# Patient Record
Sex: MALE | Race: WHITE | NOT HISPANIC OR LATINO | URBAN - METROPOLITAN AREA
[De-identification: names, ages, dates, MRNs, and addresses within clinical notes are randomized per-mention and may not be internally consistent; named-entity substitution may affect disease eponyms.]

---

## 2024-03-21 ENCOUNTER — HOSPITAL ENCOUNTER (OUTPATIENT)
Dept: RADIOLOGY | Facility: CLINIC | Age: 29
Discharge: HOME | End: 2024-03-21
Payer: COMMERCIAL

## 2024-03-21 ENCOUNTER — OFFICE VISIT (OUTPATIENT)
Dept: ORTHOPEDIC SURGERY | Facility: CLINIC | Age: 29
End: 2024-03-21
Payer: COMMERCIAL

## 2024-03-21 VITALS — BODY MASS INDEX: 24.74 KG/M2 | HEIGHT: 75 IN | WEIGHT: 199 LBS

## 2024-03-21 DIAGNOSIS — M54.16 LUMBAR RADICULOPATHY: ICD-10-CM

## 2024-03-21 PROCEDURE — 72110 X-RAY EXAM L-2 SPINE 4/>VWS: CPT | Performed by: RADIOLOGY

## 2024-03-21 PROCEDURE — 72110 X-RAY EXAM L-2 SPINE 4/>VWS: CPT

## 2024-03-21 PROCEDURE — 99213 OFFICE O/P EST LOW 20 MIN: CPT | Mod: ZK | Performed by: PHYSICIAN ASSISTANT

## 2024-03-21 PROCEDURE — 99203 OFFICE O/P NEW LOW 30 MIN: CPT | Performed by: PHYSICIAN ASSISTANT

## 2024-03-21 RX ORDER — PREDNISONE 20 MG/1
TABLET ORAL
Qty: 9 TABLET | Refills: 0 | Status: SHIPPED | OUTPATIENT
Start: 2024-03-21

## 2024-03-21 ASSESSMENT — PAIN - FUNCTIONAL ASSESSMENT: PAIN_FUNCTIONAL_ASSESSMENT: 0-10

## 2024-03-21 NOTE — PROGRESS NOTES
Juan Luis is a 28-year-old male who presents today with his mom and his dad.  He is a .  This is not a work-related injury but does believe that it happened while he was working out.    He is a very active individual, he works out on a regular basis.  He states that Thursday he was doing a full body workout and developed pain but it was not until Sunday when the severe sciatica down the left lower extremity started.  The patient reports that it has progressively gotten worse and he only gets relief when he bends forward or he sits in a bent over position.  Anytime he is standing or in an extension position of his torso the symptoms will shoot into his left SI and continue in a posterior lateral distribution down to his calf.    Thankfully his right lower extremity is asymptomatic.  No hip or groin pain and he has full control of his bowel and bladder.  He does ambulate without assistance.    From a treatment standpoint he has not done any formal physical therapy.  He takes ibuprofen and Aleve as needed.      Family, social, and medical histories are obtained and reviewed.  -He Does smoke marijuana    I reviewed the complete 30-point review of systems that was documented on the scanned patient intake form.  All other systems are non-contributory except as defined in history of present illness.    Const: Well-appearing, well-nourished [male] in no distress.  Eyes: Normal appearing sclera and conjunctiva, no jaundice, pupils normal in appearance.  Resp: breathing comfortably, normal respiratory rate.  CV: No upper or lower extremity edema.  Musculoskeletal: [Normal gait].   Lumbar ROM is extremely limited due to muscle tightness and reciprocated pain.  Strength exam of the lower extremities reveals 5/5 strength in all major muscle groups she has extreme a hamstring tightness on the left more than the right..  Positive straight leg raise bilaterally, the patient is unable to fully extend either of his lower  extremities without reciprocated pain.  His left was significantly worse and he has extreme tightness throughout his L4-V nerve.  Neuro: Sensation is intact and equal bilaterally. Deep tendon reflexes are [normal and symmetric].  [No clonus].  Skin: Intact without any lesions, normal turgor.  Psych: Alert and oriented x3, normal mood and affect.    We are going to obtain x-rays of his lumbar spine.  He will get this after the visit    We discussed a couple options.  Based off of his clinical findings and the significance of his pain with an ipsilateral straight leg test along with the left side straight leg test I have a high suspicion of a herniated disc.  I did order the patient an MRI of his lumbar spine and informed him that he should probably get those done at an outside facility, he was given a list of places to call.  Along with that he was also given an order for physical therapy to continue to build his strength and core and back muscles.  I am going to place him on a steroid as well for him to take over the next 7 days and he was given my direct office number to call if he has any questions or concerns.    This note was dictated using speech recognition software and was not corrected for spelling or grammatical errors

## 2024-03-26 ENCOUNTER — OFFICE VISIT (OUTPATIENT)
Dept: ORTHOPEDIC SURGERY | Facility: CLINIC | Age: 29
End: 2024-03-26
Payer: COMMERCIAL

## 2024-03-26 VITALS — WEIGHT: 199 LBS | BODY MASS INDEX: 24.74 KG/M2 | HEIGHT: 75 IN

## 2024-03-26 DIAGNOSIS — M54.16 LUMBAR RADICULOPATHY: Primary | ICD-10-CM

## 2024-03-26 PROCEDURE — 99213 OFFICE O/P EST LOW 20 MIN: CPT | Mod: ZK | Performed by: PHYSICIAN ASSISTANT

## 2024-03-26 PROCEDURE — 1036F TOBACCO NON-USER: CPT | Performed by: PHYSICIAN ASSISTANT

## 2024-03-26 PROCEDURE — 99213 OFFICE O/P EST LOW 20 MIN: CPT | Performed by: PHYSICIAN ASSISTANT

## 2024-03-26 ASSESSMENT — PAIN - FUNCTIONAL ASSESSMENT: PAIN_FUNCTIONAL_ASSESSMENT: 0-10

## 2024-03-26 ASSESSMENT — PAIN SCALES - GENERAL: PAINLEVEL_OUTOF10: 5 - MODERATE PAIN

## 2024-03-26 NOTE — PROGRESS NOTES
Juan Luis returns today to review the results of the MRI of his lumbar spine.  The patient had the MRI done at [facility] and did bring a disc with him but unfortunately the disc did not load.    We were able to get a copy of his MRI results faxed over to us for review.  These results reveal that the L1-L3 he does not have any signs of spinal stenosis.  At the L3-4 he has a moderate disc bulge causing moderate bilateral narrowing and mild stenosis.  Most notably those at the L4-5 and L5-S1 he has a large herniated disks causing anterior and posterior thecal sac changes resulting in moderate to severe stenosis with impingement on both the left and right nerve.    The patient was given a copy of his results and I am also going to keep this copy and scanned into his chart.    The patient's family is previous surgical patient of Dr. Guevara and would like to see him to discuss the next steps.  In the meantime we are going to continue to see if we can get the imaging to upload to our system.    He Was placed on the schedule for next week to discuss his options.    I reviewed the complete 30-point review of systems that was documented on the scanned patient intake form.  All other systems are non-contributory except as defined in history of present illness.    This note was dictated using speech recognition software and was not corrected for spelling or grammatical errors

## 2024-04-03 ENCOUNTER — APPOINTMENT (OUTPATIENT)
Dept: ORTHOPEDIC SURGERY | Facility: CLINIC | Age: 29
End: 2024-04-03
Payer: COMMERCIAL

## 2024-04-17 ENCOUNTER — OFFICE VISIT (OUTPATIENT)
Dept: ORTHOPEDIC SURGERY | Facility: CLINIC | Age: 29
End: 2024-04-17
Payer: COMMERCIAL

## 2024-04-17 DIAGNOSIS — M54.16 LUMBAR RADICULOPATHY: ICD-10-CM

## 2024-04-17 PROCEDURE — 99214 OFFICE O/P EST MOD 30 MIN: CPT | Performed by: ORTHOPAEDIC SURGERY

## 2024-04-17 NOTE — PROGRESS NOTES
Juan Luis is a pleasant 28-year-old .  I had taken care of his younger brother Bon in the past.    About a month ago he developed the spontaneous onset of rather severe left sciatica.  It was disabling for about 10 or 11 days.  However his symptoms are quite a bit better at this point.    He localizes the pain to the left buttock posterior thigh and calf.  No right leg symptoms.    He has had some remote issues with his back dating to a lacrosse injury while in college.    Again though he is quite active as a .    Family, social, and medical histories are obtained and reviewed.      30-point, patient-recorded Review of Systems is personally obtained and reviewed. Inclusive is no history of weight loss, change in appetite, recent change in activity level, change in bowel or bladder habits, fevers, chills, malaise, or night pain.    Healthy-appearing appearing person no acute distress. Stable gait. Posture is upright. Painless flexion and extension of the lumbar spine. Painless motion in both hips. Strength intact in the lower extremities without pathologic reflexes. Palpable dorsalis pedis pulses.    His lumbar MRI shows a moderately large extruded disc herniation on the left at L4-5.    Impression: This young man has developed left sciatica that correlates with a L4-5 disc herniation.  Initially his pain was disabling but he has had significant improvement since that time.  Given his improvement, I would hold off on consideration of surgery.  I think he would benefit from a therapy program and we will refer him for this.    He will keep us updated on his progress.  If his symptoms were to recur in any severity he would contact us.    Follow-up as needed.    ** Dictated with voice recognition software and not immediately reviewed for errors in grammar and/or spelling **

## 2024-05-08 ENCOUNTER — APPOINTMENT (OUTPATIENT)
Dept: ORTHOPEDIC SURGERY | Facility: CLINIC | Age: 29
End: 2024-05-08
Payer: COMMERCIAL

## 2024-09-07 ENCOUNTER — HOSPITAL ENCOUNTER (EMERGENCY)
Facility: HOSPITAL | Age: 29
Discharge: HOME | End: 2024-09-07
Payer: COMMERCIAL

## 2024-09-07 VITALS
TEMPERATURE: 98.6 F | WEIGHT: 205 LBS | DIASTOLIC BLOOD PRESSURE: 72 MMHG | OXYGEN SATURATION: 98 % | BODY MASS INDEX: 25.49 KG/M2 | HEIGHT: 75 IN | SYSTOLIC BLOOD PRESSURE: 138 MMHG | HEART RATE: 57 BPM | RESPIRATION RATE: 16 BRPM

## 2024-09-07 DIAGNOSIS — M54.42 ACUTE BILATERAL LOW BACK PAIN WITH LEFT-SIDED SCIATICA: Primary | ICD-10-CM

## 2024-09-07 PROCEDURE — 2500000001 HC RX 250 WO HCPCS SELF ADMINISTERED DRUGS (ALT 637 FOR MEDICARE OP): Performed by: NURSE PRACTITIONER

## 2024-09-07 PROCEDURE — 2500000004 HC RX 250 GENERAL PHARMACY W/ HCPCS (ALT 636 FOR OP/ED): Performed by: NURSE PRACTITIONER

## 2024-09-07 PROCEDURE — 99283 EMERGENCY DEPT VISIT LOW MDM: CPT

## 2024-09-07 RX ORDER — PREDNISONE 20 MG/1
60 TABLET ORAL ONCE
Status: COMPLETED | OUTPATIENT
Start: 2024-09-07 | End: 2024-09-07

## 2024-09-07 RX ORDER — CYCLOBENZAPRINE HCL 10 MG
10 TABLET ORAL 3 TIMES DAILY PRN
Qty: 15 TABLET | Refills: 0 | Status: SHIPPED | OUTPATIENT
Start: 2024-09-07

## 2024-09-07 RX ORDER — PREDNISONE 20 MG/1
40 TABLET ORAL DAILY
Qty: 8 TABLET | Refills: 0 | Status: SHIPPED | OUTPATIENT
Start: 2024-09-07 | End: 2024-09-11

## 2024-09-07 RX ORDER — CYCLOBENZAPRINE HCL 10 MG
10 TABLET ORAL ONCE
Status: COMPLETED | OUTPATIENT
Start: 2024-09-07 | End: 2024-09-07

## 2024-09-07 RX ADMIN — CYCLOBENZAPRINE 10 MG: 10 TABLET, FILM COATED ORAL at 02:17

## 2024-09-07 RX ADMIN — PREDNISONE 60 MG: 20 TABLET ORAL at 02:18

## 2024-09-07 ASSESSMENT — COLUMBIA-SUICIDE SEVERITY RATING SCALE - C-SSRS
1. IN THE PAST MONTH, HAVE YOU WISHED YOU WERE DEAD OR WISHED YOU COULD GO TO SLEEP AND NOT WAKE UP?: NO
6. HAVE YOU EVER DONE ANYTHING, STARTED TO DO ANYTHING, OR PREPARED TO DO ANYTHING TO END YOUR LIFE?: NO
2. HAVE YOU ACTUALLY HAD ANY THOUGHTS OF KILLING YOURSELF?: NO

## 2024-09-07 ASSESSMENT — PAIN DESCRIPTION - LOCATION: LOCATION: BACK

## 2024-09-07 ASSESSMENT — PAIN DESCRIPTION - FREQUENCY: FREQUENCY: CONSTANT/CONTINUOUS

## 2024-09-07 ASSESSMENT — PAIN SCALES - GENERAL: PAINLEVEL_OUTOF10: 8

## 2024-09-07 ASSESSMENT — PAIN DESCRIPTION - ORIENTATION: ORIENTATION: LEFT

## 2024-09-07 ASSESSMENT — PAIN DESCRIPTION - PAIN TYPE: TYPE: ACUTE PAIN

## 2024-09-07 ASSESSMENT — PAIN - FUNCTIONAL ASSESSMENT: PAIN_FUNCTIONAL_ASSESSMENT: 0-10

## 2024-09-07 NOTE — ED PROVIDER NOTES
HPI   Chief Complaint   Patient presents with    Back Pain       Patient is a healthy nontoxic-appearing 28-year-old male with past medical history of lumbar radiculopathy, presents to the emergency room today for complaint of back pain.  Patient states he has had some right back pain several months ago and was diagnosed with sciatica.  Patient states for the past several days he has been having gradually worsening pain that became worse yesterday.  Patient denies any injuries trauma or falls.  Patient denies any urinary complaints, testicular pain, blood in the urine.  Patient denies any loss of bowel or bladder control, abdominal pain nausea vomiting or diarrhea or constipation, chest pain or shortness of breath difficulty breathing, fever, shaking, or chills.              Patient History   Past Medical History:   Diagnosis Date    Acne 08/13/2015     History reviewed. No pertinent surgical history.  No family history on file.  Social History     Tobacco Use    Smoking status: Never    Smokeless tobacco: Never   Substance Use Topics    Alcohol use: Not on file    Drug use: Not on file       Physical Exam   ED Triage Vitals [09/07/24 0151]   Temperature Heart Rate Respirations BP   37 °C (98.6 °F) 57 16 138/72      Pulse Ox Temp Source Heart Rate Source Patient Position   98 % Temporal Monitor Sitting      BP Location FiO2 (%)     Right arm --       Physical Exam  Vitals and nursing note reviewed.   Constitutional:       General: He is not in acute distress.     Appearance: Normal appearance. He is not ill-appearing, toxic-appearing or diaphoretic.   HENT:      Head: Normocephalic.   Cardiovascular:      Rate and Rhythm: Normal rate and regular rhythm.      Pulses: Normal pulses.      Heart sounds: Normal heart sounds. No murmur heard.     No friction rub. No gallop.   Pulmonary:      Effort: Pulmonary effort is normal. No respiratory distress.      Breath sounds: Normal breath sounds. No stridor. No wheezing,  rhonchi or rales.   Chest:      Chest wall: No tenderness.   Musculoskeletal:         General: Tenderness present. No swelling, deformity or signs of injury. Normal range of motion.      Cervical back: Normal range of motion and neck supple.      Right lower leg: No edema.      Left lower leg: No edema.      Comments: Reproducible tenderness upon palpation of the bilateral lower lumbar back with no midline lumbar spinal tenderness no crepitus no step-offs upon palpation, negative straight leg exam, able to flex and stand toes no difficulty, able to run heel from knee to ankle however is painful, no loss of bowel bladder control, no saddle paresthesia, independently ambulatory without any difficulty.   Skin:     General: Skin is warm.      Capillary Refill: Capillary refill takes less than 2 seconds.      Coloration: Skin is not jaundiced or pale.      Findings: No bruising, erythema, lesion or rash.   Neurological:      General: No focal deficit present.      Mental Status: He is alert and oriented to person, place, and time.           ED Course & MDM   Diagnoses as of 09/07/24 0239   Acute bilateral low back pain with left-sided sciatica                 No data recorded     Abran Coma Scale Score: 15 (09/07/24 0152 : Isis Noble RN)                           Medical Decision Making  Given patient's complaint and presentation a thorough exam was performed.  On exam patient has Reproducible tenderness upon palpation of the bilateral lower lumbar back with no midline lumbar spinal tenderness no crepitus no step-offs upon palpation, negative straight leg exam, able to flex and stand toes no difficulty, able to run heel from knee to ankle however is painful, no loss of bowel bladder control, no saddle paresthesia, independently ambulatory without any difficulty.  Remains hemodynamically stable during emergency evaluation, is afebrile, have a low suspicion for epidural abscess, cauda equina syndrome.  Given  patient's pain is reproducible and worse by movement I do suspect patient is experiencing musculoskeletal strain.  Patient requesting prednisone as he states this has worked in the past as well as muscle relaxers.  Patient received prednisone and Flexeril in the emergency room as well as prescription for the same.  I strongly encouraged monitoring symptoms, if they become worse return to emergency room for further evaluation, otherwise follow-up with orthopedic surgeon as needed.  Patient was agreeable with this plan and discharged home in stable condition.    SOPHY Silverman     Portions of this note were generated using digital voice recognition software, and may contain grammatical errors      Procedure  Procedures     SOPHY Silverman  09/07/24 0239

## 2024-09-23 ENCOUNTER — APPOINTMENT (OUTPATIENT)
Dept: ORTHOPEDIC SURGERY | Facility: CLINIC | Age: 29
End: 2024-09-23
Payer: COMMERCIAL

## 2024-09-23 DIAGNOSIS — M54.42 ACUTE BILATERAL LOW BACK PAIN WITH LEFT-SIDED SCIATICA: ICD-10-CM

## 2024-09-23 PROCEDURE — 1036F TOBACCO NON-USER: CPT | Performed by: PHYSICIAN ASSISTANT

## 2024-09-23 PROCEDURE — 99212 OFFICE O/P EST SF 10 MIN: CPT | Performed by: PHYSICIAN ASSISTANT

## 2024-09-23 RX ORDER — MELOXICAM 15 MG/1
15 TABLET ORAL DAILY
Qty: 30 TABLET | Refills: 0 | Status: SHIPPED | OUTPATIENT
Start: 2024-09-23 | End: 2024-10-23

## 2024-09-23 RX ORDER — CYCLOBENZAPRINE HCL 10 MG
10 TABLET ORAL 2 TIMES DAILY PRN
Qty: 60 TABLET | Refills: 0 | Status: SHIPPED | OUTPATIENT
Start: 2024-09-23 | End: 2024-10-23

## 2024-09-23 NOTE — LETTER
September 23, 2024     Patient: Juan Luis Charles   YOB: 1995   Date of Visit: 9/23/2024       To Whom It May Concern:    It is my medical opinion that Juan Luis Charles is going to be off work for 1 week, returning to work September 30th. He may return to light duty immediately with the following restrictions: starting September 30th, 2024 and will be on them for the next 4 weeks. Please limit his lifting to no more than 50 lbs and limit repeativite lunging/ squatting  to prevent further irritation and inflammation of his nerve.     He can return to work with no restrictions starting October 30th, 2024.    If you have any questions or concerns, please don't hesitate to call.    [phone]         Sincerely,        Traci Garces PA-C    CC: No Recipients

## 2024-09-23 NOTE — PROGRESS NOTES
Juan Luis returns today for a follow up visit.    Last seen by Dr Guevara In April.    Today, returns due to an exacerbation of his L spine. He states he was playing volleyball all weekend and re aggravated the nerve.    He is here today with his dad and reports LBP beltline, muscle tightness and L LE sciatica sxs.    We did discuss that he needs to take it easy and cont with conservative care to prevent these exacerbations. He is getting back into PT again and we discussed dry needling as well.    I will send him in a refill of mobic and flexeril. Take prn.    We did provide him a week off from work and then a work excuse for 4 weeks with lifting restrictions no more than 50 lbs. After Oct 30 he can return with no restrictions     Follow up prn    I reviewed the complete 30-point review of systems that was documented on the scanned patient intake form.  All other systems are non-contributory except as defined in history of present illness.    This note was dictated using speech recognition software and was not corrected for spelling or grammatical errors

## 2024-10-16 ENCOUNTER — APPOINTMENT (OUTPATIENT)
Dept: PHYSICAL THERAPY | Facility: CLINIC | Age: 29
End: 2024-10-16
Payer: COMMERCIAL

## 2024-11-06 ENCOUNTER — APPOINTMENT (OUTPATIENT)
Dept: ORTHOPEDIC SURGERY | Facility: CLINIC | Age: 29
End: 2024-11-06
Payer: COMMERCIAL